# Patient Record
Sex: MALE | Race: BLACK OR AFRICAN AMERICAN | NOT HISPANIC OR LATINO | Employment: OTHER | URBAN - METROPOLITAN AREA
[De-identification: names, ages, dates, MRNs, and addresses within clinical notes are randomized per-mention and may not be internally consistent; named-entity substitution may affect disease eponyms.]

---

## 2022-10-26 ENCOUNTER — HOSPITAL ENCOUNTER (OUTPATIENT)
Facility: HOSPITAL | Age: 72
Setting detail: OBSERVATION
Discharge: HOME | End: 2022-10-27
Attending: EMERGENCY MEDICINE | Admitting: EMERGENCY MEDICINE

## 2022-10-26 ENCOUNTER — APPOINTMENT (OUTPATIENT)
Dept: GENERAL RADIOLOGY | Facility: HOSPITAL | Age: 72
End: 2022-10-26

## 2022-10-26 DIAGNOSIS — R42 LIGHTHEADEDNESS: ICD-10-CM

## 2022-10-26 DIAGNOSIS — R55 SYNCOPE AND COLLAPSE: Primary | ICD-10-CM

## 2022-10-26 LAB
BASOPHILS # BLD AUTO: 0.03 10*3/MM3 (ref 0–0.2)
BASOPHILS NFR BLD AUTO: 0.5 % (ref 0–1.5)
DEPRECATED RDW RBC AUTO: 44.4 FL (ref 37–54)
EOSINOPHIL # BLD AUTO: 0.05 10*3/MM3 (ref 0–0.4)
EOSINOPHIL NFR BLD AUTO: 0.9 % (ref 0.3–6.2)
ERYTHROCYTE [DISTWIDTH] IN BLOOD BY AUTOMATED COUNT: 14.9 % (ref 12.3–15.4)
HCT VFR BLD AUTO: 38.6 % (ref 37.5–51)
HGB BLD-MCNC: 12.8 G/DL (ref 13–17.7)
LYMPHOCYTES # BLD AUTO: 0.42 10*3/MM3 (ref 0.7–3.1)
LYMPHOCYTES NFR BLD AUTO: 7.5 % (ref 19.6–45.3)
MCH RBC QN AUTO: 27.2 PG (ref 26.6–33)
MCHC RBC AUTO-ENTMCNC: 33.2 G/DL (ref 31.5–35.7)
MCV RBC AUTO: 82.1 FL (ref 79–97)
MONOCYTES # BLD AUTO: 0.63 10*3/MM3 (ref 0.1–0.9)
MONOCYTES NFR BLD AUTO: 11.3 % (ref 5–12)
NEUTROPHILS NFR BLD AUTO: 4.46 10*3/MM3 (ref 1.7–7)
NEUTROPHILS NFR BLD AUTO: 79.6 % (ref 42.7–76)
NT-PROBNP SERPL-MCNC: 139 PG/ML (ref 0–900)
PLATELET # BLD AUTO: 141 10*3/MM3 (ref 140–450)
PMV BLD AUTO: 10.8 FL (ref 6–12)
RBC # BLD AUTO: 4.7 10*6/MM3 (ref 4.14–5.8)
WBC NRBC COR # BLD: 5.6 10*3/MM3 (ref 3.4–10.8)

## 2022-10-26 PROCEDURE — 36415 COLL VENOUS BLD VENIPUNCTURE: CPT

## 2022-10-26 PROCEDURE — 99284 EMERGENCY DEPT VISIT MOD MDM: CPT

## 2022-10-26 PROCEDURE — 80053 COMPREHEN METABOLIC PANEL: CPT | Performed by: EMERGENCY MEDICINE

## 2022-10-26 PROCEDURE — 84484 ASSAY OF TROPONIN QUANT: CPT | Performed by: EMERGENCY MEDICINE

## 2022-10-26 PROCEDURE — 85025 COMPLETE CBC W/AUTO DIFF WBC: CPT | Performed by: EMERGENCY MEDICINE

## 2022-10-26 PROCEDURE — 93010 ELECTROCARDIOGRAM REPORT: CPT | Performed by: STUDENT IN AN ORGANIZED HEALTH CARE EDUCATION/TRAINING PROGRAM

## 2022-10-26 PROCEDURE — 93005 ELECTROCARDIOGRAM TRACING: CPT | Performed by: EMERGENCY MEDICINE

## 2022-10-26 PROCEDURE — 71045 X-RAY EXAM CHEST 1 VIEW: CPT

## 2022-10-26 PROCEDURE — 83880 ASSAY OF NATRIURETIC PEPTIDE: CPT | Performed by: EMERGENCY MEDICINE

## 2022-10-26 PROCEDURE — 99285 EMERGENCY DEPT VISIT HI MDM: CPT

## 2022-10-26 PROCEDURE — 93005 ELECTROCARDIOGRAM TRACING: CPT

## 2022-10-26 RX ORDER — SODIUM CHLORIDE 0.9 % (FLUSH) 0.9 %
10 SYRINGE (ML) INJECTION AS NEEDED
Status: DISCONTINUED | OUTPATIENT
Start: 2022-10-26 | End: 2022-10-27 | Stop reason: HOSPADM

## 2022-10-27 ENCOUNTER — APPOINTMENT (OUTPATIENT)
Dept: CARDIOLOGY | Facility: HOSPITAL | Age: 72
End: 2022-10-27

## 2022-10-27 VITALS
RESPIRATION RATE: 16 BRPM | BODY MASS INDEX: 22.05 KG/M2 | HEIGHT: 68 IN | HEART RATE: 57 BPM | OXYGEN SATURATION: 99 % | SYSTOLIC BLOOD PRESSURE: 149 MMHG | WEIGHT: 145.5 LBS | DIASTOLIC BLOOD PRESSURE: 96 MMHG | TEMPERATURE: 98 F

## 2022-10-27 PROBLEM — R55 SYNCOPE: Status: ACTIVE | Noted: 2022-10-27

## 2022-10-27 LAB
ALBUMIN SERPL-MCNC: 4.1 G/DL (ref 3.5–5.2)
ALBUMIN/GLOB SERPL: 1.6 G/DL
ALP SERPL-CCNC: 73 U/L (ref 39–117)
ALT SERPL W P-5'-P-CCNC: 11 U/L (ref 1–41)
ANION GAP SERPL CALCULATED.3IONS-SCNC: 13.6 MMOL/L (ref 5–15)
ANION GAP SERPL CALCULATED.3IONS-SCNC: 9.8 MMOL/L (ref 5–15)
AORTIC DIMENSIONLESS INDEX: 0.9 (DI)
ASCENDING AORTA: 3.4 CM
AST SERPL-CCNC: 18 U/L (ref 1–40)
BH CV ECHO MEAS - ACS: 1.36 CM
BH CV ECHO MEAS - AO MAX PG: 4.8 MMHG
BH CV ECHO MEAS - AO MEAN PG: 2.35 MMHG
BH CV ECHO MEAS - AO ROOT DIAM: 4.1 CM
BH CV ECHO MEAS - AO V2 MAX: 109.8 CM/SEC
BH CV ECHO MEAS - AO V2 VTI: 24.1 CM
BH CV ECHO MEAS - AVA(I,D): 3.3 CM2
BH CV ECHO MEAS - EDV(CUBED): 110.7 ML
BH CV ECHO MEAS - EDV(MOD-SP2): 50 ML
BH CV ECHO MEAS - EDV(MOD-SP4): 92 ML
BH CV ECHO MEAS - EF(MOD-BP): 68 %
BH CV ECHO MEAS - EF(MOD-SP2): 60 %
BH CV ECHO MEAS - EF(MOD-SP4): 69.6 %
BH CV ECHO MEAS - ESV(CUBED): 28.2 ML
BH CV ECHO MEAS - ESV(MOD-SP2): 20 ML
BH CV ECHO MEAS - ESV(MOD-SP4): 28 ML
BH CV ECHO MEAS - FS: 36.6 %
BH CV ECHO MEAS - IVS/LVPW: 1.03 CM
BH CV ECHO MEAS - IVSD: 1.27 CM
BH CV ECHO MEAS - LAT PEAK E' VEL: 7.5 CM/SEC
BH CV ECHO MEAS - LV DIASTOLIC VOL/BSA (35-75): 51.5 CM2
BH CV ECHO MEAS - LV MASS(C)D: 232.2 GRAMS
BH CV ECHO MEAS - LV MAX PG: 4.5 MMHG
BH CV ECHO MEAS - LV MEAN PG: 1.86 MMHG
BH CV ECHO MEAS - LV SYSTOLIC VOL/BSA (12-30): 15.7 CM2
BH CV ECHO MEAS - LV V1 MAX: 106.3 CM/SEC
BH CV ECHO MEAS - LV V1 VTI: 21.6 CM
BH CV ECHO MEAS - LVIDD: 4.8 CM
BH CV ECHO MEAS - LVIDS: 3 CM
BH CV ECHO MEAS - LVOT AREA: 3.6 CM2
BH CV ECHO MEAS - LVOT DIAM: 2.15 CM
BH CV ECHO MEAS - LVPWD: 1.23 CM
BH CV ECHO MEAS - MED PEAK E' VEL: 6.4 CM/SEC
BH CV ECHO MEAS - MR MAX PG: 62.2 MMHG
BH CV ECHO MEAS - MR MAX VEL: 394.3 CM/SEC
BH CV ECHO MEAS - MV A DUR: 0.14 SEC
BH CV ECHO MEAS - MV A MAX VEL: 50.1 CM/SEC
BH CV ECHO MEAS - MV DEC SLOPE: 201.6 CM/SEC2
BH CV ECHO MEAS - MV DEC TIME: 0.2 MSEC
BH CV ECHO MEAS - MV E MAX VEL: 64.7 CM/SEC
BH CV ECHO MEAS - MV E/A: 1.29
BH CV ECHO MEAS - MV MAX PG: 1.4 MMHG
BH CV ECHO MEAS - MV MEAN PG: 0.54 MMHG
BH CV ECHO MEAS - MV P1/2T: 84.5 MSEC
BH CV ECHO MEAS - MV V2 VTI: 20.4 CM
BH CV ECHO MEAS - MVA(P1/2T): 2.6 CM2
BH CV ECHO MEAS - MVA(VTI): 3.8 CM2
BH CV ECHO MEAS - PA ACC TIME: 0.11 SEC
BH CV ECHO MEAS - PA PR(ACCEL): 30.3 MMHG
BH CV ECHO MEAS - PA V2 MAX: 77.4 CM/SEC
BH CV ECHO MEAS - QP/QS: 0.57
BH CV ECHO MEAS - RAP SYSTOLE: 3 MMHG
BH CV ECHO MEAS - RV MAX PG: 2.9 MMHG
BH CV ECHO MEAS - RV V1 MAX: 85.9 CM/SEC
BH CV ECHO MEAS - RV V1 VTI: 16.3 CM
BH CV ECHO MEAS - RVOT DIAM: 1.87 CM
BH CV ECHO MEAS - RVSP: 36 MMHG
BH CV ECHO MEAS - SI(MOD-SP2): 16.8 ML/M2
BH CV ECHO MEAS - SI(MOD-SP4): 35.8 ML/M2
BH CV ECHO MEAS - SV(LVOT): 78.4 ML
BH CV ECHO MEAS - SV(MOD-SP2): 30 ML
BH CV ECHO MEAS - SV(MOD-SP4): 64 ML
BH CV ECHO MEAS - SV(RVOT): 44.9 ML
BH CV ECHO MEAS - TAPSE (>1.6): 1.83 CM
BH CV ECHO MEAS - TR MAX PG: 32.2 MMHG
BH CV ECHO MEAS - TR MAX VEL: 283.7 CM/SEC
BH CV ECHO MEASUREMENTS AVERAGE E/E' RATIO: 9.31
BH CV XLRA - TDI S': 14 CM/SEC
BH CV XLRA MEAS LEFT DIST CCA EDV: -25.8 CM/SEC
BH CV XLRA MEAS LEFT DIST CCA PSV: -84.4 CM/SEC
BH CV XLRA MEAS LEFT DIST ICA EDV: -27.6 CM/SEC
BH CV XLRA MEAS LEFT DIST ICA PSV: -69.8 CM/SEC
BH CV XLRA MEAS LEFT ICA/CCA RATIO: 0.91
BH CV XLRA MEAS LEFT MID ICA EDV: -27.6 CM/SEC
BH CV XLRA MEAS LEFT MID ICA PSV: -72.7 CM/SEC
BH CV XLRA MEAS LEFT PROX CCA EDV: 31.7 CM/SEC
BH CV XLRA MEAS LEFT PROX CCA PSV: 115 CM/SEC
BH CV XLRA MEAS LEFT PROX ECA EDV: -16.4 CM/SEC
BH CV XLRA MEAS LEFT PROX ECA PSV: -93.2 CM/SEC
BH CV XLRA MEAS LEFT PROX ICA EDV: -20.5 CM/SEC
BH CV XLRA MEAS LEFT PROX ICA PSV: -76.8 CM/SEC
BH CV XLRA MEAS LEFT PROX SCLA PSV: 88 CM/SEC
BH CV XLRA MEAS LEFT VERTEBRAL A EDV: 20.5 CM/SEC
BH CV XLRA MEAS LEFT VERTEBRAL A PSV: 92 CM/SEC
BH CV XLRA MEAS RIGHT DIST CCA EDV: -22.9 CM/SEC
BH CV XLRA MEAS RIGHT DIST CCA PSV: -83.3 CM/SEC
BH CV XLRA MEAS RIGHT DIST ICA EDV: -28.7 CM/SEC
BH CV XLRA MEAS RIGHT DIST ICA PSV: -80.3 CM/SEC
BH CV XLRA MEAS RIGHT ICA/CCA RATIO: 1.07
BH CV XLRA MEAS RIGHT MID ICA EDV: -34 CM/SEC
BH CV XLRA MEAS RIGHT MID ICA PSV: -89.1 CM/SEC
BH CV XLRA MEAS RIGHT PROX CCA EDV: 15.8 CM/SEC
BH CV XLRA MEAS RIGHT PROX CCA PSV: 82.7 CM/SEC
BH CV XLRA MEAS RIGHT PROX ECA EDV: -13.5 CM/SEC
BH CV XLRA MEAS RIGHT PROX ECA PSV: -99.1 CM/SEC
BH CV XLRA MEAS RIGHT PROX ICA EDV: -25.2 CM/SEC
BH CV XLRA MEAS RIGHT PROX ICA PSV: -71.5 CM/SEC
BH CV XLRA MEAS RIGHT PROX SCLA PSV: 73.3 CM/SEC
BH CV XLRA MEAS RIGHT VERTEBRAL A EDV: 14.1 CM/SEC
BH CV XLRA MEAS RIGHT VERTEBRAL A PSV: 73.3 CM/SEC
BILIRUB SERPL-MCNC: 0.4 MG/DL (ref 0–1.2)
BUN SERPL-MCNC: 15 MG/DL (ref 8–23)
BUN SERPL-MCNC: 18 MG/DL (ref 8–23)
BUN/CREAT SERPL: 11.4 (ref 7–25)
BUN/CREAT SERPL: 11.8 (ref 7–25)
CALCIUM SPEC-SCNC: 9 MG/DL (ref 8.6–10.5)
CALCIUM SPEC-SCNC: 9.1 MG/DL (ref 8.6–10.5)
CHLORIDE SERPL-SCNC: 109 MMOL/L (ref 98–107)
CHLORIDE SERPL-SCNC: 110 MMOL/L (ref 98–107)
CO2 SERPL-SCNC: 23.4 MMOL/L (ref 22–29)
CO2 SERPL-SCNC: 27.2 MMOL/L (ref 22–29)
CREAT SERPL-MCNC: 1.32 MG/DL (ref 0.76–1.27)
CREAT SERPL-MCNC: 1.53 MG/DL (ref 0.76–1.27)
EGFRCR SERPLBLD CKD-EPI 2021: 47.7 ML/MIN/1.73
EGFRCR SERPLBLD CKD-EPI 2021: 57 ML/MIN/1.73
GLOBULIN UR ELPH-MCNC: 2.6 GM/DL
GLUCOSE SERPL-MCNC: 92 MG/DL (ref 65–99)
GLUCOSE SERPL-MCNC: 99 MG/DL (ref 65–99)
LEFT ARM BP: NORMAL MMHG
LEFT ATRIUM VOLUME INDEX: 19.4 ML/M2
MAXIMAL PREDICTED HEART RATE: 147 BPM
MAXIMAL PREDICTED HEART RATE: 147 BPM
POTASSIUM SERPL-SCNC: 3.6 MMOL/L (ref 3.5–5.2)
POTASSIUM SERPL-SCNC: 3.8 MMOL/L (ref 3.5–5.2)
PROT SERPL-MCNC: 6.7 G/DL (ref 6–8.5)
QT INTERVAL: 516 MS
RIGHT ARM BP: NORMAL MMHG
SINUS: 3.5 CM
SODIUM SERPL-SCNC: 146 MMOL/L (ref 136–145)
SODIUM SERPL-SCNC: 147 MMOL/L (ref 136–145)
STJ: 3.2 CM
STRESS TARGET HR: 125 BPM
STRESS TARGET HR: 125 BPM
TROPONIN T SERPL-MCNC: <0.01 NG/ML (ref 0–0.03)

## 2022-10-27 PROCEDURE — 80048 BASIC METABOLIC PNL TOTAL CA: CPT | Performed by: NURSE PRACTITIONER

## 2022-10-27 PROCEDURE — G0378 HOSPITAL OBSERVATION PER HR: HCPCS

## 2022-10-27 PROCEDURE — 96361 HYDRATE IV INFUSION ADD-ON: CPT

## 2022-10-27 PROCEDURE — 99204 OFFICE O/P NEW MOD 45 MIN: CPT | Performed by: INTERNAL MEDICINE

## 2022-10-27 PROCEDURE — 93246 EXT ECG>7D<15D RECORDING: CPT

## 2022-10-27 PROCEDURE — 96360 HYDRATION IV INFUSION INIT: CPT

## 2022-10-27 PROCEDURE — 93306 TTE W/DOPPLER COMPLETE: CPT | Performed by: INTERNAL MEDICINE

## 2022-10-27 PROCEDURE — 93306 TTE W/DOPPLER COMPLETE: CPT

## 2022-10-27 PROCEDURE — 93880 EXTRACRANIAL BILAT STUDY: CPT

## 2022-10-27 RX ORDER — FLUTICASONE PROPIONATE AND SALMETEROL 100; 50 UG/1; UG/1
1 POWDER RESPIRATORY (INHALATION)
COMMUNITY

## 2022-10-27 RX ORDER — SODIUM CHLORIDE 0.9 % (FLUSH) 0.9 %
10 SYRINGE (ML) INJECTION AS NEEDED
Status: DISCONTINUED | OUTPATIENT
Start: 2022-10-27 | End: 2022-10-27 | Stop reason: HOSPADM

## 2022-10-27 RX ORDER — FLUTICASONE PROPIONATE 50 MCG
1 SPRAY, SUSPENSION (ML) NASAL 2 TIMES DAILY
COMMUNITY

## 2022-10-27 RX ORDER — SODIUM CHLORIDE 9 MG/ML
75 INJECTION, SOLUTION INTRAVENOUS CONTINUOUS
Status: DISCONTINUED | OUTPATIENT
Start: 2022-10-27 | End: 2022-10-27

## 2022-10-27 RX ORDER — ASPIRIN 81 MG/1
81 TABLET, CHEWABLE ORAL DAILY
Status: DISCONTINUED | OUTPATIENT
Start: 2022-10-27 | End: 2022-10-27 | Stop reason: HOSPADM

## 2022-10-27 RX ORDER — TRIAMCINOLONE ACETONIDE 0.25 MG/G
1 CREAM TOPICAL 2 TIMES DAILY
COMMUNITY

## 2022-10-27 RX ORDER — NITROGLYCERIN 0.4 MG/1
0.4 TABLET SUBLINGUAL
Status: DISCONTINUED | OUTPATIENT
Start: 2022-10-27 | End: 2022-10-27 | Stop reason: HOSPADM

## 2022-10-27 RX ORDER — SODIUM CHLORIDE 0.9 % (FLUSH) 0.9 %
10 SYRINGE (ML) INJECTION EVERY 12 HOURS SCHEDULED
Status: DISCONTINUED | OUTPATIENT
Start: 2022-10-27 | End: 2022-10-27 | Stop reason: HOSPADM

## 2022-10-27 RX ORDER — AMLODIPINE BESYLATE 10 MG/1
10 TABLET ORAL DAILY
Status: DISCONTINUED | OUTPATIENT
Start: 2022-10-27 | End: 2022-10-27 | Stop reason: HOSPADM

## 2022-10-27 RX ORDER — METOPROLOL TARTRATE 50 MG/1
25 TABLET, FILM COATED ORAL 2 TIMES DAILY
COMMUNITY

## 2022-10-27 RX ORDER — ASPIRIN 81 MG/1
81 TABLET, CHEWABLE ORAL DAILY
COMMUNITY

## 2022-10-27 RX ORDER — SODIUM CHLORIDE, SODIUM LACTATE, POTASSIUM CHLORIDE, CALCIUM CHLORIDE 600; 310; 30; 20 MG/100ML; MG/100ML; MG/100ML; MG/100ML
100 INJECTION, SOLUTION INTRAVENOUS CONTINUOUS
Status: DISCONTINUED | OUTPATIENT
Start: 2022-10-27 | End: 2022-10-27 | Stop reason: HOSPADM

## 2022-10-27 RX ORDER — LORATADINE 10 MG/1
1 CAPSULE, LIQUID FILLED ORAL DAILY
COMMUNITY

## 2022-10-27 RX ORDER — ALBUTEROL SULFATE 90 UG/1
2 AEROSOL, METERED RESPIRATORY (INHALATION) EVERY 4 HOURS PRN
COMMUNITY

## 2022-10-27 RX ORDER — AMLODIPINE BESYLATE 10 MG/1
10 TABLET ORAL DAILY
COMMUNITY

## 2022-10-27 RX ORDER — LOSARTAN POTASSIUM 50 MG/1
100 TABLET ORAL DAILY
COMMUNITY

## 2022-10-27 RX ADMIN — Medication 10 ML: at 09:09

## 2022-10-27 RX ADMIN — SODIUM CHLORIDE, POTASSIUM CHLORIDE, SODIUM LACTATE AND CALCIUM CHLORIDE 100 ML/HR: 600; 310; 30; 20 INJECTION, SOLUTION INTRAVENOUS at 10:42

## 2022-10-27 RX ADMIN — AMLODIPINE BESYLATE 10 MG: 10 TABLET ORAL at 09:08

## 2022-10-27 RX ADMIN — ASPIRIN 81 MG: 81 TABLET, CHEWABLE ORAL at 09:08

## 2022-10-27 RX ADMIN — SODIUM CHLORIDE 75 ML/HR: 9 INJECTION, SOLUTION INTRAVENOUS at 02:47

## 2022-10-27 RX ADMIN — SODIUM CHLORIDE 1000 ML: 9 INJECTION, SOLUTION INTRAVENOUS at 00:36

## 2022-10-27 RX ADMIN — Medication 10 ML: at 02:47

## 2022-11-18 ENCOUNTER — OFFICE VISIT (OUTPATIENT)
Dept: CARDIOLOGY | Facility: CLINIC | Age: 72
End: 2022-11-18

## 2022-11-18 VITALS
HEIGHT: 68 IN | WEIGHT: 135.6 LBS | DIASTOLIC BLOOD PRESSURE: 88 MMHG | SYSTOLIC BLOOD PRESSURE: 138 MMHG | HEART RATE: 51 BPM | BODY MASS INDEX: 20.55 KG/M2

## 2022-11-18 DIAGNOSIS — I77.810 AORTIC ROOT DILATION: ICD-10-CM

## 2022-11-18 DIAGNOSIS — R06.02 SHORTNESS OF BREATH: ICD-10-CM

## 2022-11-18 DIAGNOSIS — R53.83 OTHER FATIGUE: ICD-10-CM

## 2022-11-18 DIAGNOSIS — R00.1 SINUS BRADYCARDIA: ICD-10-CM

## 2022-11-18 DIAGNOSIS — I34.0 NONRHEUMATIC MITRAL VALVE REGURGITATION: ICD-10-CM

## 2022-11-18 DIAGNOSIS — R55 SYNCOPE, UNSPECIFIED SYNCOPE TYPE: ICD-10-CM

## 2022-11-18 DIAGNOSIS — R94.31 ABNORMAL EKG: ICD-10-CM

## 2022-11-18 DIAGNOSIS — Z09 HOSPITAL DISCHARGE FOLLOW-UP: Primary | ICD-10-CM

## 2022-11-18 PROCEDURE — 99214 OFFICE O/P EST MOD 30 MIN: CPT | Performed by: NURSE PRACTITIONER

## 2022-11-18 PROCEDURE — 93000 ELECTROCARDIOGRAM COMPLETE: CPT | Performed by: NURSE PRACTITIONER

## 2022-11-18 RX ORDER — DEXAMETHASONE SODIUM PHOSPHATE 1 MG/ML
SOLUTION/ DROPS OPHTHALMIC
COMMUNITY
Start: 2022-09-04

## 2022-11-18 NOTE — PROGRESS NOTES
Date of Office Visit: 2022  Encounter Provider: MARILIA Cruz  Place of Service: [facility] CARDIOLOGY  Patient Name: Alphonse Delarosa Sr.  :1950  Primary Cardiologist: Dr. Cali Villa    Chief Complaint   Patient presents with   • Loss of Consciousness   • Hospital Follow Up Visit   :     HPI: Alphonse Delarosa Sr. is a pleasant 72 y.o. male who presents today for hospital follow-up visit.  He is a new patient to me and I reviewed his medical records.    He is a [employer] .  He has been diagnosed with asthma, pulmonary sarcoidosis, and hypertension.  [facility].    In 2022, he presented to the ED.  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  While walking to his car, the lightheadedness worsened and then he had a syncopal episode.  He did not have bowel or bladder incontinence.  When he came to he felt better.  He presented to [facility] ED.  EKG was abnormal showing NSR with LVH and T wave inversions V3-V6 from possible strain pattern.  Troponin level and orthostatic BPs were normal.  Dr. Rafa Kruger consulted.  Echocardiogram showed EF normal, mild LVH, grade 1 diastolic dysfunction, left atrium mildly dilated, mild MR, mild TR, trivial pericardial effusion, and aortic root measured 4.1 cm.  Carotid Doppler was normal.  He was discharged on a 14-day Holter monitor.    He presents today with his wife accompanying him.  He has turned in the Holter monitor and while wearing it he experienced lightheadedness, but no syncope.  He says he is always short of breath and coughs from his lung disease.  He denies chest pain, palpitations, edema, dizziness, syncope, or bleeding.      Past Medical History:   Diagnosis Date   • Abnormal EKG 2022   • Asthma    • Hypertension    • Pulmonary sarcoidosis (HCC)    • Sinus bradycardia        History reviewed. No pertinent surgical history.    Social History     Socioeconomic History   •  Marital status:    Tobacco Use   • Smoking status: Never   • Smokeless tobacco: Never   Vaping Use   • Vaping Use: Never used   Substance and Sexual Activity   • Alcohol use: Yes     Alcohol/week: 1.0 standard drink     Types: 1 Shots of liquor per week   • Drug use: Never   • Sexual activity: Defer       History reviewed. No pertinent family history.    The following portion of the patient's history were reviewed and updated as appropriate: past medical history, past surgical history, past social history, past family history, allergies, current medications, and problem list.    Review of Systems   Constitutional: Positive for chills and weight loss.   Cardiovascular: Positive for dyspnea on exertion.   Respiratory: Positive for cough and shortness of breath.    Hematologic/Lymphatic: Negative.    Musculoskeletal: Positive for joint pain and myalgias.   Neurological: Negative.        Allergies   Allergen Reactions   • Hydrochlorothiazide Other (See Comments)   • Lisinopril Other (See Comments)   • Naproxen Nausea And Vomiting   • Amoxicillin Itching and Rash         Current Outpatient Medications:   •  albuterol sulfate  (90 Base) MCG/ACT inhaler, Inhale 2 puffs Every 4 (Four) Hours As Needed for Wheezing., Disp: , Rfl:   •  amLODIPine (NORVASC) 10 MG tablet, Take 1 tablet by mouth Daily., Disp: , Rfl:   •  aspirin 81 MG chewable tablet, Chew 1 tablet Daily., Disp: , Rfl:   •  dexamethasone (DECADRON) 0.1 % ophthalmic solution, , Disp: , Rfl:   •  Diclofenac Sodium (VOLTAREN) 1 % gel gel, Apply 4 g topically to the appropriate area as directed 4 (Four) Times a Day As Needed. Apply to bilateral knees, Disp: , Rfl:   •  fluticasone (FLONASE) 50 MCG/ACT nasal spray, 1 spray into the nostril(s) as directed by provider 2 (Two) Times a Day., Disp: , Rfl:   •  Fluticasone-Salmeterol (ADVAIR/WIXELA) 100-50 MCG/ACT DISKUS, Inhale 1 puff 2 (Two) Times a Day., Disp: , Rfl:   •  Loratadine 10 MG capsule, Take 1  "capsule by mouth Daily., Disp: , Rfl:   •  losartan (COZAAR) 50 MG tablet, Take 2 tablets by mouth Daily., Disp: , Rfl:   •  metoprolol tartrate (LOPRESSOR) 50 MG tablet, Take 25 mg by mouth 2 (Two) Times a Day., Disp: , Rfl:   •  tiotropium bromide monohydrate (SPIRIVA RESPIMAT) 2.5 MCG/ACT aerosol solution inhaler, Inhale 2 puffs Daily., Disp: , Rfl:   •  triamcinolone (KENALOG) 0.025 % cream, Apply 1 application topically to the appropriate area as directed 2 (Two) Times a Day. Apply to BLE's, Disp: , Rfl:         Objective:     Vitals:    11/18/22 1435   BP: 138/88   BP Location: Left arm   Patient Position: Sitting   Cuff Size: Adult   Pulse: 51   Weight: 61.5 kg (135 lb 9.6 oz)   Height: 173 cm (68.11\")     Body mass index is 20.55 kg/m².    PHYSICAL EXAM:    Vitals Reviewed.   General Appearance: No acute distress, well developed and well nourished.  Thin.   Eyes: Conjunctiva and lids: No erythema, swelling, or discharge. Sclera non-icteric. Glasses.   HENT: Atraumatic, normocephalic. External eyes, ears, and nose normal. No hearing loss noted. Mucous membranes normal. Lips not cyanotic. Neck supple with no tenderness. Wearing mask.   Respiratory: No signs of respiratory distress. Respiration rhythm and depth normal.   Clear to auscultation. No rales, crackles, rhonchi, or wheezing auscultated.   Cardiovascular:  Jugular Venous Pressure: Normal  Heart Rate and Rhythm: Normal, Heart Sounds: Normal S1 and S2. No S3 or S4 noted.  Murmurs: No murmurs noted. No rubs, thrills, or gallops.   Lower Extremities: No edema noted.  Gastrointestinal:  Abdomen soft, non-distended, non-tender.    Musculoskeletal: Normal movement of extremities.  Skin and Nails: General appearance normal. No pallor, cyanosis, diaphoresis. Skin temperature normal. No clubbing of fingernails.   Psychiatric: Patient alert and oriented to person, place, and time. Speech and behavior appropriate. Normal mood and affect.       ECG 12 " Lead    Date/Time: 11/18/2022 2:29 PM  Performed by: Chelita Soriano APRN  Authorized by: Chelita Soriano APRN   Comparison: compared with previous ECG   Similar to previous ECG  Rhythm: sinus bradycardia  Rate: bradycardic  BPM: 51  Conduction: conduction normal  ST Depression: V3, V4, V5, V6, II, III and aVF  T inversion: I, V3, V4, V5 and V6  QRS axis: normal  Other findings: left ventricular hypertrophy    Clinical impression: abnormal EKG              Assessment:       Diagnosis Plan   1. Hospital discharge follow-up        2. Syncope, unspecified syncope type  Stress Test With Myocardial Perfusion One Day      3. Abnormal EKG  Stress Test With Myocardial Perfusion One Day      4. Sinus bradycardia        5. Other fatigue        6. Shortness of breath        7. Nonrheumatic mitral valve regurgitation        8. Aortic root dilation (HCC)               Plan:       1/2.  Recently hospitalized after having lightheadedness and a syncopal episode.  Etiology unknown.  We discussed natural modalities to prevent future syncopal episodes.  Keeping himself well-hydrated, sitting down if he feels near syncopal, compression stockings, and slow position changes.  He recently was lightheaded while wearing the monitor and we will review those results.    3.  Abnormal EKG: EKG shows T wave inversions V3-V6, ST depression in the inferior and anterior leads.  LVH noted and strain pattern.  I discussed with Dr. Villa.  She recommended starting with a myocardial perfusion stress test.  If normal, we will then proceed with a cardiac MRI since he has a history of pulmonary sarcoidosis.     4.  Bradycardia: Heart rate 51 today.  He is on beta-blocker therapy.  If his heart rate is running low on the monitor, we may need to decrease metoprolol.    5.  Fatigue: He has been more tired since the syncopal event.    6/7.  He states that he is always short of breath from the pulmonary sarcoidosis.    8/9.  Mild mitral and tricuspid  regurgitation noted on recent echocardiogram.    10.  Aortic Root Dilation: Measured 4.1 cm on echocardiogram.    11.  Further recommendations to follow after review the Holter monitor and stress test results.    ADDENDUM 12/19/2022:  · Myocardial perfusion study normal.  · 14-day Holter monitor showed NSR with average heart rate of 60 bpm, rare APCs, 19 episodes of SVT longest 16 beats in duration and the fastest with a rate of 174, rare PVCs.  Patient reported 5 episodes that correlated with premature atrial contractions.    · Continue beta-blocker.  · Dr. Villa recommended a cardiac MRI to assess for cardiac sarcoidosis.  · Pt informed of results, verbalizes understanding, and would like to proceed.       As always, it has been a pleasure to participate in your patient's care. Thank you.       Sincerely,         MARILIA Green  [facility] Cardiology      · Dictated utilizing Dragon Dictation  · COVID-19 Precautions - Patient was compliant in wearing a mask. When I saw the patient, I used appropriate personal protective equipment (PPE) including mask and eye shield (standard procedure).  Additionally, I used gown and gloves if indicated.  Hand hygiene was completed before and after seeing the patient.

## 2022-11-21 LAB
MAXIMAL PREDICTED HEART RATE: 147 BPM
STRESS TARGET HR: 125 BPM

## 2022-11-21 PROCEDURE — 93248 EXT ECG>7D<15D REV&INTERPJ: CPT | Performed by: INTERNAL MEDICINE

## 2022-11-22 ENCOUNTER — TELEPHONE (OUTPATIENT)
Dept: CARDIOLOGY | Facility: CLINIC | Age: 72
End: 2022-11-22

## 2022-11-22 PROBLEM — I47.1 PSVT (PAROXYSMAL SUPRAVENTRICULAR TACHYCARDIA): Status: ACTIVE | Noted: 2022-11-22

## 2022-11-22 NOTE — TELEPHONE ENCOUNTER
Please call patient.  I reviewed his Holter monitor results.  This showed normal sinus rhythm with an average heart rate of 60 bpm and the minimum was 42 and the maximum was 174.  He had 19 brief episodes of SVT (fast heartbeat from the top of the heart).  The longest episode was 16 beats in duration and the fastest rate was at 174.  This could potentially be causing him to have brief lightheadedness.  Otherwise there was really nothing on there that would cause him to pass out.    He will be having a stress test completed in the near future.

## 2022-11-23 NOTE — TELEPHONE ENCOUNTER
Notified patient of results, patient verbalizes understanding.    Scheduling: Patient needs to reschedule his stress test.  Can you call him at [phone] please?  I have asked him to keep his phone near him today.  Thank you for the help.    Sonia Ceasr RN  [facility] Triage  11/23/22 09:03 EST

## 2022-12-16 ENCOUNTER — HOSPITAL ENCOUNTER (OUTPATIENT)
Dept: CARDIOLOGY | Facility: HOSPITAL | Age: 72
Discharge: HOME | End: 2022-12-16
Admitting: NURSE PRACTITIONER

## 2022-12-16 VITALS — HEIGHT: 68 IN | BODY MASS INDEX: 20.72 KG/M2 | WEIGHT: 136.69 LBS

## 2022-12-16 DIAGNOSIS — R55 SYNCOPE, UNSPECIFIED SYNCOPE TYPE: ICD-10-CM

## 2022-12-16 DIAGNOSIS — R94.31 ABNORMAL EKG: ICD-10-CM

## 2022-12-16 LAB
BH CV NUCLEAR PRIOR STUDY: 2
BH CV REST NUCLEAR ISOTOPE DOSE: 10.5 MCI
BH CV STRESS BP STAGE 1: NORMAL
BH CV STRESS BP STAGE 2: NORMAL
BH CV STRESS DURATION MIN STAGE 1: 3
BH CV STRESS DURATION MIN STAGE 2: 3
BH CV STRESS DURATION MIN STAGE 3: 0
BH CV STRESS DURATION SEC STAGE 1: 0
BH CV STRESS DURATION SEC STAGE 2: 0
BH CV STRESS DURATION SEC STAGE 3: 7
BH CV STRESS GRADE STAGE 1: 10
BH CV STRESS GRADE STAGE 2: 12
BH CV STRESS GRADE STAGE 3: 14
BH CV STRESS HR STAGE 1: 86
BH CV STRESS HR STAGE 2: 107
BH CV STRESS HR STAGE 3: 111
BH CV STRESS METS STAGE 1: 5
BH CV STRESS METS STAGE 2: 7.5
BH CV STRESS METS STAGE 3: 8
BH CV STRESS NUCLEAR ISOTOPE DOSE: 35.5 MCI
BH CV STRESS PROTOCOL 1: NORMAL
BH CV STRESS PROTOCOL 2 BP STAGE 1: NORMAL
BH CV STRESS PROTOCOL 2 COMMENTS STAGE 1: NORMAL
BH CV STRESS PROTOCOL 2 DOSE REGADENOSON STAGE 1: 0.4
BH CV STRESS PROTOCOL 2 DURATION MIN STAGE 1: 0
BH CV STRESS PROTOCOL 2 DURATION SEC STAGE 1: 10
BH CV STRESS PROTOCOL 2 HR STAGE 1: 111
BH CV STRESS PROTOCOL 2 STAGE 1: 1
BH CV STRESS PROTOCOL 2: NORMAL
BH CV STRESS RECOVERY BP: NORMAL MMHG
BH CV STRESS RECOVERY HR: 66 BPM
BH CV STRESS SPEED STAGE 1: 1.7
BH CV STRESS SPEED STAGE 2: 2.5
BH CV STRESS SPEED STAGE 3: 3.4
BH CV STRESS STAGE 1: 1
BH CV STRESS STAGE 2: 2
BH CV STRESS STAGE 3: 3
LV EF NUC BP: 55 %
MAXIMAL PREDICTED HEART RATE: 147 BPM
PERCENT MAX PREDICTED HR: 75.51 %
STRESS BASELINE BP: NORMAL MMHG
STRESS BASELINE HR: 51 BPM
STRESS PERCENT HR: 89 %
STRESS POST EXERCISE DUR SEC: 10 SEC
STRESS POST PEAK BP: NORMAL MMHG
STRESS POST PEAK HR: 111 BPM
STRESS TARGET HR: 125 BPM

## 2022-12-16 PROCEDURE — 78452 HT MUSCLE IMAGE SPECT MULT: CPT

## 2022-12-16 PROCEDURE — 93017 CV STRESS TEST TRACING ONLY: CPT

## 2022-12-16 PROCEDURE — 78452 HT MUSCLE IMAGE SPECT MULT: CPT | Performed by: STUDENT IN AN ORGANIZED HEALTH CARE EDUCATION/TRAINING PROGRAM

## 2022-12-16 PROCEDURE — 93018 CV STRESS TEST I&R ONLY: CPT | Performed by: STUDENT IN AN ORGANIZED HEALTH CARE EDUCATION/TRAINING PROGRAM

## 2022-12-16 PROCEDURE — 25010000002 REGADENOSON 0.4 MG/5ML SOLUTION: Performed by: NURSE PRACTITIONER

## 2022-12-16 PROCEDURE — 93016 CV STRESS TEST SUPVJ ONLY: CPT | Performed by: STUDENT IN AN ORGANIZED HEALTH CARE EDUCATION/TRAINING PROGRAM

## 2022-12-16 PROCEDURE — 0 TECHNETIUM TETROFOSMIN KIT: Performed by: NURSE PRACTITIONER

## 2022-12-16 PROCEDURE — A9502 TC99M TETROFOSMIN: HCPCS | Performed by: NURSE PRACTITIONER

## 2022-12-16 RX ADMIN — REGADENOSON 0.4 MG: 0.08 INJECTION, SOLUTION INTRAVENOUS at 12:36

## 2022-12-16 RX ADMIN — TETROFOSMIN 1 DOSE: 1.38 INJECTION, POWDER, LYOPHILIZED, FOR SOLUTION INTRAVENOUS at 12:24

## 2022-12-16 RX ADMIN — TETROFOSMIN 1 DOSE: 1.38 INJECTION, POWDER, LYOPHILIZED, FOR SOLUTION INTRAVENOUS at 11:46

## 2022-12-19 ENCOUNTER — TELEPHONE (OUTPATIENT)
Dept: CARDIOLOGY | Facility: CLINIC | Age: 72
End: 2022-12-19

## 2022-12-19 DIAGNOSIS — D86.9 SARCOID: ICD-10-CM

## 2022-12-19 DIAGNOSIS — R06.02 SHORTNESS OF BREATH: Primary | ICD-10-CM

## 2022-12-19 NOTE — TELEPHONE ENCOUNTER
Myocardial perfusion study normal.    14-day Holter monitor showed NSR with average heart rate of 60 bpm, rare APCs, 19 episodes of SVT longest 16 beats in duration and the fastest with a rate of 174, rare PVCs.  Patient reported 5 episodes that correlated with premature atrial contractions.  Continue beta-blocker.    Dr. Villa recommended a cardiac MRI to assess for cardiac sarcoidosis.    Pt informed of results, verbalizes understanding, and would like to proceed.

## 2022-12-22 ENCOUNTER — HOSPITAL ENCOUNTER (OUTPATIENT)
Dept: MRI IMAGING | Facility: HOSPITAL | Age: 72
Discharge: HOME | End: 2022-12-22
Admitting: NURSE PRACTITIONER

## 2022-12-22 PROCEDURE — 82565 ASSAY OF CREATININE: CPT

## 2022-12-22 PROCEDURE — 75561 CARDIAC MRI FOR MORPH W/DYE: CPT | Performed by: INTERNAL MEDICINE

## 2022-12-22 PROCEDURE — A9577 INJ MULTIHANCE: HCPCS | Performed by: NURSE PRACTITIONER

## 2022-12-22 PROCEDURE — 0 GADOBENATE DIMEGLUMINE 529 MG/ML SOLUTION: Performed by: NURSE PRACTITIONER

## 2022-12-22 PROCEDURE — 75561 CARDIAC MRI FOR MORPH W/DYE: CPT

## 2022-12-22 RX ADMIN — GADOBENATE DIMEGLUMINE 12 ML: 529 INJECTION, SOLUTION INTRAVENOUS at 10:35

## 2022-12-22 NOTE — PROGRESS NOTES
I spoke with patient.  Dr. Villa reviewed and also agrees that the MRI is normal.  Patient informed no sarcoid.  Follow-up with Dr. Villa in February.

## 2022-12-22 NOTE — PROGRESS NOTES
Cardiac MRI completed to rule out sarcoidosis.  Looks normal.  Please review and advise anything further.  Thanks

## 2022-12-29 LAB — CREAT BLDA-MCNC: 1.5 MG/DL (ref 0.6–1.3)

## 2023-07-17 VITALS
DIASTOLIC BLOOD PRESSURE: 69 MMHG | HEART RATE: 58 BPM | SYSTOLIC BLOOD PRESSURE: 127 MMHG | SYSTOLIC BLOOD PRESSURE: 141 MMHG | HEART RATE: 60 BPM | RESPIRATION RATE: 17 BRPM | DIASTOLIC BLOOD PRESSURE: 88 MMHG | DIASTOLIC BLOOD PRESSURE: 90 MMHG | SYSTOLIC BLOOD PRESSURE: 116 MMHG | HEART RATE: 52 BPM | OXYGEN SATURATION: 99 % | SYSTOLIC BLOOD PRESSURE: 110 MMHG | SYSTOLIC BLOOD PRESSURE: 114 MMHG | RESPIRATION RATE: 21 BRPM | DIASTOLIC BLOOD PRESSURE: 76 MMHG | OXYGEN SATURATION: 100 % | RESPIRATION RATE: 14 BRPM | HEART RATE: 53 BPM | TEMPERATURE: 96.4 F | WEIGHT: 136 LBS | DIASTOLIC BLOOD PRESSURE: 74 MMHG | RESPIRATION RATE: 15 BRPM | SYSTOLIC BLOOD PRESSURE: 150 MMHG | DIASTOLIC BLOOD PRESSURE: 75 MMHG | HEIGHT: 69 IN | RESPIRATION RATE: 16 BRPM | RESPIRATION RATE: 19 BRPM | OXYGEN SATURATION: 98 % | HEART RATE: 54 BPM | RESPIRATION RATE: 12 BRPM | SYSTOLIC BLOOD PRESSURE: 121 MMHG | HEART RATE: 43 BPM | SYSTOLIC BLOOD PRESSURE: 123 MMHG | HEART RATE: 56 BPM | DIASTOLIC BLOOD PRESSURE: 73 MMHG | DIASTOLIC BLOOD PRESSURE: 77 MMHG | HEART RATE: 50 BPM | RESPIRATION RATE: 10 BRPM | TEMPERATURE: 97.4 F | DIASTOLIC BLOOD PRESSURE: 70 MMHG | SYSTOLIC BLOOD PRESSURE: 118 MMHG | RESPIRATION RATE: 20 BRPM

## 2023-07-19 PROBLEM — Z12.11 ENCOUNTER FOR COLONOSCOPY IN PATIENT WITH FAMILY HISTORY OF COLON CANCER: Status: ACTIVE | Noted: 2023-07-20

## 2023-07-19 PROBLEM — Z80.0 FAMILY HISTORY OF COLON CANCER: Status: ACTIVE | Noted: 2023-07-20

## 2023-07-20 ENCOUNTER — OFFICE (AMBULATORY)
Dept: URBAN - METROPOLITAN AREA PATHOLOGY 4 | Facility: PATHOLOGY | Age: 73
End: 2023-07-20
Payer: OTHER GOVERNMENT

## 2023-07-20 ENCOUNTER — AMBULATORY SURGICAL CENTER (AMBULATORY)
Dept: URBAN - METROPOLITAN AREA SURGERY 17 | Facility: SURGERY | Age: 73
End: 2023-07-20
Payer: OTHER GOVERNMENT

## 2023-07-20 DIAGNOSIS — D12.2 BENIGN NEOPLASM OF ASCENDING COLON: ICD-10-CM

## 2023-07-20 DIAGNOSIS — Z80.0 FAMILY HISTORY OF MALIGNANT NEOPLASM OF DIGESTIVE ORGANS: ICD-10-CM

## 2023-07-20 PROBLEM — K63.5 POLYP OF COLON: Status: ACTIVE | Noted: 2023-07-20

## 2023-07-20 LAB
GI HISTOLOGY: A. UNSPECIFIED: (no result)
GI HISTOLOGY: PDF REPORT: (no result)

## 2023-07-20 PROCEDURE — 45385 COLONOSCOPY W/LESION REMOVAL: CPT | Mod: 33 | Performed by: INTERNAL MEDICINE

## 2023-07-20 PROCEDURE — 88305 TISSUE EXAM BY PATHOLOGIST: CPT | Performed by: INTERNAL MEDICINE
